# Patient Record
Sex: FEMALE | URBAN - METROPOLITAN AREA
[De-identification: names, ages, dates, MRNs, and addresses within clinical notes are randomized per-mention and may not be internally consistent; named-entity substitution may affect disease eponyms.]

---

## 2021-12-13 ENCOUNTER — APPOINTMENT (RX ONLY)
Dept: URBAN - METROPOLITAN AREA CLINIC 44 | Facility: CLINIC | Age: 81
Setting detail: DERMATOLOGY
End: 2021-12-13

## 2021-12-13 DIAGNOSIS — L24 IRRITANT CONTACT DERMATITIS: ICD-10-CM | Status: INADEQUATELY CONTROLLED

## 2021-12-13 PROBLEM — L24.9 IRRITANT CONTACT DERMATITIS, UNSPECIFIED CAUSE: Status: ACTIVE | Noted: 2021-12-13

## 2021-12-13 PROCEDURE — ? FULL BODY SKIN EXAM - DECLINED

## 2021-12-13 PROCEDURE — ? ADDITIONAL NOTES

## 2021-12-13 PROCEDURE — ? COUNSELING

## 2021-12-13 PROCEDURE — ? PRESCRIPTION

## 2021-12-13 PROCEDURE — ? MEDICATION COUNSELING

## 2021-12-13 PROCEDURE — 99203 OFFICE O/P NEW LOW 30 MIN: CPT

## 2021-12-13 PROCEDURE — ? PRESCRIPTION MEDICATION MANAGEMENT

## 2021-12-13 RX ORDER — TRIAMCINOLONE ACETONIDE 0.25 MG/G
OINTMENT TOPICAL
Qty: 80 | Refills: 0 | Status: ERX | COMMUNITY
Start: 2021-12-13

## 2021-12-13 RX ORDER — PREDNISONE 10 MG/1
TABLET ORAL
Qty: 40 | Refills: 0 | Status: ERX | COMMUNITY
Start: 2021-12-13

## 2021-12-13 RX ADMIN — PREDNISONE: 10 TABLET ORAL at 00:00

## 2021-12-13 RX ADMIN — TRIAMCINOLONE ACETONIDE: 0.25 OINTMENT TOPICAL at 00:00

## 2021-12-13 ASSESSMENT — LOCATION DETAILED DESCRIPTION DERM
LOCATION DETAILED: LEFT LOWER CUTANEOUS LIP
LOCATION DETAILED: LEFT UPPER CUTANEOUS LIP
LOCATION DETAILED: RIGHT UPPER CUTANEOUS LIP

## 2021-12-13 ASSESSMENT — LOCATION SIMPLE DESCRIPTION DERM
LOCATION SIMPLE: RIGHT LIP
LOCATION SIMPLE: LEFT LIP

## 2021-12-13 ASSESSMENT — LOCATION ZONE DERM: LOCATION ZONE: LIP

## 2021-12-13 NOTE — HPI: RASH
What Type Of Note Output Would You Prefer (Optional)?: Bullet Format
Is The Patient Presenting As Previously Scheduled?: Yes
How Severe Is Your Rash?: mild
Is This A New Presentation, Or A Follow-Up?: Rash
Additional History: Went to urgent care and was given several medications but nothing has worked

## 2021-12-13 NOTE — PROCEDURE: ADDITIONAL NOTES
Additional Notes: Patient consent was obtained to proceed with the visit and recommended plan of care after discussion of all risks and benefits, including the risks of COVID-19 exposure.
Detail Level: Simple
Render Risk Assessment In Note?: yes
Additional Notes: Discontinue fragranced soaps/lotions\\nUse gentle cleanser/lotion
Detail Level: Zone
Render Risk Assessment In Note?: no

## 2021-12-13 NOTE — PROCEDURE: PRESCRIPTION MEDICATION MANAGEMENT
Initiate Treatment: triamcinolone acetonide 0.025 % topical ointment \\nQuantity: 80.0 g  Days Supply: 30\\nSig: Apply to affected area of face, twice daily for 2 weeks as needed\\n\\nprednisone 10 mg tablet \\nQuantity: 40.0 Tablet\\nSig: Take 4 tablets po qd x 4 days, then 3 tablets po qd x 4 days, then 2 tablets po qd x 4 days, then 1 tablet po qd x 4 days then stop
Render In Strict Bullet Format?: No
Detail Level: Zone
Samples Given: Cetaphil/Cerave moisturizer and cleanser

## 2021-12-13 NOTE — PROCEDURE: MEDICATION COUNSELING
VIEW ALL Finasteride Pregnancy And Lactation Text: This medication is absolutely contraindicated during pregnancy. It is unknown if it is excreted in breast milk.

## 2022-01-03 ENCOUNTER — APPOINTMENT (RX ONLY)
Dept: URBAN - METROPOLITAN AREA CLINIC 44 | Facility: CLINIC | Age: 82
Setting detail: DERMATOLOGY
End: 2022-01-03

## 2022-01-03 VITALS — WEIGHT: 152 LBS

## 2022-01-03 DIAGNOSIS — L259 CONTACT DERMATITIS AND OTHER ECZEMA, UNSPECIFIED CAUSE: ICD-10-CM | Status: INADEQUATELY CONTROLLED

## 2022-01-03 PROBLEM — L30.9 DERMATITIS, UNSPECIFIED: Status: ACTIVE | Noted: 2022-01-03

## 2022-01-03 PROCEDURE — 11102 TANGNTL BX SKIN SINGLE LES: CPT

## 2022-01-03 PROCEDURE — ? PRESCRIPTION MEDICATION MANAGEMENT

## 2022-01-03 PROCEDURE — ? ADDITIONAL NOTES

## 2022-01-03 PROCEDURE — ? FULL BODY SKIN EXAM - DECLINED

## 2022-01-03 PROCEDURE — ? BIOPSY BY SHAVE METHOD

## 2022-01-03 ASSESSMENT — LOCATION SIMPLE DESCRIPTION DERM: LOCATION SIMPLE: RIGHT LIP

## 2022-01-03 ASSESSMENT — LOCATION DETAILED DESCRIPTION DERM: LOCATION DETAILED: RIGHT LOWER CUTANEOUS LIP

## 2022-01-03 ASSESSMENT — LOCATION ZONE DERM: LOCATION ZONE: LIP

## 2022-01-03 NOTE — PROCEDURE: ADDITIONAL NOTES
Additional Notes: Patient consent was obtained to proceed with the visit and recommended plan of care after discussion of all risks and benefits, including the risks of COVID-19 exposure.
Detail Level: Simple
Render Risk Assessment In Note?: yes
Additional Notes: Recommended skin diet avoid any products on face besides Vanicream \\n\\nDiscontinue fragranced soaps/lotions\\nUse gentle cleanser/lotion\\nDiscuss possible need for patch testing
Detail Level: Zone
Render Risk Assessment In Note?: no

## 2022-01-05 ENCOUNTER — RX ONLY (OUTPATIENT)
Age: 82
Setting detail: RX ONLY
End: 2022-01-05

## 2022-01-05 RX ORDER — PREDNISONE 20 MG/1
TABLET ORAL
Qty: 63 | Refills: 0 | Status: ERX | COMMUNITY
Start: 2022-01-05

## 2022-01-31 ENCOUNTER — APPOINTMENT (RX ONLY)
Dept: URBAN - METROPOLITAN AREA CLINIC 44 | Facility: CLINIC | Age: 82
Setting detail: DERMATOLOGY
End: 2022-01-31

## 2022-01-31 DIAGNOSIS — L20.89 OTHER ATOPIC DERMATITIS: ICD-10-CM | Status: INADEQUATELY CONTROLLED

## 2022-01-31 PROCEDURE — ? FULL BODY SKIN EXAM - DECLINED

## 2022-01-31 PROCEDURE — 99214 OFFICE O/P EST MOD 30 MIN: CPT

## 2022-01-31 PROCEDURE — ? PRESCRIPTION

## 2022-01-31 PROCEDURE — ? ADDITIONAL NOTES

## 2022-01-31 PROCEDURE — ? MEDICATION COUNSELING

## 2022-01-31 PROCEDURE — ? PRESCRIPTION MEDICATION MANAGEMENT

## 2022-01-31 PROCEDURE — ? COUNSELING

## 2022-01-31 RX ORDER — RUXOLITINIB 15 MG/G
CREAM TOPICAL
Qty: 60 | Refills: 1 | Status: ACTIVE

## 2022-01-31 ASSESSMENT — LOCATION ZONE DERM
LOCATION ZONE: LIP
LOCATION ZONE: FACE

## 2022-01-31 ASSESSMENT — LOCATION SIMPLE DESCRIPTION DERM
LOCATION SIMPLE: RIGHT CHEEK
LOCATION SIMPLE: RIGHT LIP
LOCATION SIMPLE: UPPER LIP
LOCATION SIMPLE: LEFT CHEEK

## 2022-01-31 ASSESSMENT — LOCATION DETAILED DESCRIPTION DERM
LOCATION DETAILED: PHILTRUM
LOCATION DETAILED: LEFT MEDIAL BUCCAL CHEEK
LOCATION DETAILED: RIGHT CENTRAL BUCCAL CHEEK
LOCATION DETAILED: RIGHT LOWER CUTANEOUS LIP

## 2022-01-31 NOTE — PROCEDURE: MEDICATION COUNSELING
DVT ppx- As per orthopedics. Topical Sulfur Applications Pregnancy And Lactation Text: This medication is Pregnancy Category C and has an unknown safety profile during pregnancy. It is unknown if this topical medication is excreted in breast milk.

## 2022-01-31 NOTE — PROCEDURE: PRESCRIPTION MEDICATION MANAGEMENT
Initiate Treatment: Opzelura 1.5 % topical cream \\nQuantity: 60.0 g  Days Supply: 30\\nSig: Apply to aa of the mouth bid x up to one week then as needed for flaring.
Detail Level: Zone
Render In Strict Bullet Format?: No
Plan: Continue skin diet. Briefly discussed MTX of not improved

## 2022-01-31 NOTE — PROCEDURE: ADDITIONAL NOTES
Detail Level: Zone
Render Risk Assessment In Note?: no
Additional Notes: Pt will be scheduled in to do PATCH TESTING next week.
Render Risk Assessment In Note?: yes
Detail Level: Simple
Additional Notes: Patient consent was obtained to proceed with the visit and recommended plan of care after discussion of all risks and benefits, including the risks of COVID-19 exposure.

## 2023-08-18 ENCOUNTER — OFFICE VISIT (OUTPATIENT)
Dept: URBAN - METROPOLITAN AREA CLINIC 54 | Facility: CLINIC | Age: 83
End: 2023-08-18
Payer: MEDICARE

## 2023-08-18 ENCOUNTER — WEB ENCOUNTER (OUTPATIENT)
Dept: URBAN - METROPOLITAN AREA CLINIC 54 | Facility: CLINIC | Age: 83
End: 2023-08-18

## 2023-08-18 VITALS
BODY MASS INDEX: 23.63 KG/M2 | SYSTOLIC BLOOD PRESSURE: 163 MMHG | HEART RATE: 58 BPM | HEIGHT: 65 IN | TEMPERATURE: 97.6 F | DIASTOLIC BLOOD PRESSURE: 80 MMHG | WEIGHT: 141.8 LBS

## 2023-08-18 DIAGNOSIS — R10.12 LUQ ABDOMINAL PAIN: ICD-10-CM

## 2023-08-18 DIAGNOSIS — R10.13 EPIGASTRIC PAIN: ICD-10-CM

## 2023-08-18 PROBLEM — 79922009: Status: ACTIVE | Noted: 2023-08-18

## 2023-08-18 PROBLEM — 301715003: Status: ACTIVE | Noted: 2023-08-18

## 2023-08-18 PROCEDURE — 99204 OFFICE O/P NEW MOD 45 MIN: CPT | Performed by: PHYSICIAN ASSISTANT

## 2023-08-18 RX ORDER — SUCRALFATE 1 G/1
1 TABLET ON AN EMPTY STOMACH TABLET ORAL TWICE A DAY
Qty: 28 TABLET | OUTPATIENT
Start: 2023-08-18 | End: 2023-09-01

## 2023-08-18 RX ORDER — OMEPRAZOLE 20 MG/1
1 CAPSULE 30 MINUTES BEFORE MORNING MEAL CAPSULE, DELAYED RELEASE ORAL ONCE A DAY
Qty: 30 | Refills: 1 | OUTPATIENT
Start: 2023-08-18

## 2023-08-18 RX ORDER — LEVOTHYROXINE SODIUM 25 UG/1
1 TABLET IN THE MORNING ON AN EMPTY STOMACH TABLET ORAL ONCE A DAY
Status: ACTIVE | COMMUNITY

## 2023-08-18 RX ORDER — NAPROXEN 375 MG/1
1 TABLET WITH FOOD OR MILK AS NEEDED TABLET ORAL
Status: ACTIVE | COMMUNITY

## 2023-08-18 RX ORDER — POLYETHYLENE GLYCOL 3350 17 G/17G
1 SCOOP MIXED WITH 8 OUNCES OF FLUID POWDER, FOR SOLUTION ORAL ONCE A DAY
Status: ACTIVE | COMMUNITY

## 2023-08-18 NOTE — HPI-TODAY'S VISIT:
Meri Daley is an 82-year-old female pt with PMH of hypothyroidism here for LUQ pain. Onset was March 2023. Patient dx with shingles in 3/2023 to left mid back and along LUQ.  After the rash healed, she continued to have LUQ pain. CT A/P performed in March that was WNL. RUQ US showed a gallstone but no signs of cholecystitis. Lab work including lipase WNL. Patient states pain is different from shingles but constant. She describes it as squeezing/pulsing. Pain is worse with eating. Denies reflux symptoms. NSAID use x 1 month. No previous abdominal surgeries. No hx of DM. Last EGD and colonoscopy was 20 years ago in [place]. Denies known cardiac history.
Unknown if ever smoked

## 2023-08-18 NOTE — PHYSICAL EXAM SKIN:
no rashes , no suspicious lesions , no areas of discoloration , no jaundice present , good turgor , no masses , no tenderness on palpation. no signs of shingles. No tenderness to light touch of the skin.

## 2023-08-30 ENCOUNTER — OFFICE VISIT (OUTPATIENT)
Dept: URBAN - METROPOLITAN AREA SURGERY CENTER 14 | Facility: SURGERY CENTER | Age: 83
End: 2023-08-30
Payer: MEDICARE

## 2023-08-30 ENCOUNTER — CLAIMS CREATED FROM THE CLAIM WINDOW (OUTPATIENT)
Dept: URBAN - METROPOLITAN AREA CLINIC 4 | Facility: CLINIC | Age: 83
End: 2023-08-30
Payer: MEDICARE

## 2023-08-30 DIAGNOSIS — K21.9 GASTRO-ESOPHAGEAL REFLUX DISEASE WITHOUT ESOPHAGITIS: ICD-10-CM

## 2023-08-30 DIAGNOSIS — B96.81 HELICOBACTER PYLORI [H. PYLORI] AS THE CAUSE OF DISEASES CLASSIFIED ELSEWHERE: ICD-10-CM

## 2023-08-30 DIAGNOSIS — K29.60 OTHER GASTRITIS WITHOUT BLEEDING: ICD-10-CM

## 2023-08-30 DIAGNOSIS — B96.81 BACTERIAL INFECTION DUE TO H. PYLORI: ICD-10-CM

## 2023-08-30 DIAGNOSIS — K21.9 GASTROESOPHAGEAL REFLUX DISEASE: ICD-10-CM

## 2023-08-30 DIAGNOSIS — R10.13 EPIGASTRIC PAIN: ICD-10-CM

## 2023-08-30 DIAGNOSIS — R10.12 LUQ ABDOMINAL PAIN: ICD-10-CM

## 2023-08-30 PROCEDURE — 88305 TISSUE EXAM BY PATHOLOGIST: CPT | Performed by: PATHOLOGY

## 2023-08-30 PROCEDURE — 88312 SPECIAL STAINS GROUP 1: CPT | Performed by: PATHOLOGY

## 2023-08-30 PROCEDURE — 43239 EGD BIOPSY SINGLE/MULTIPLE: CPT | Performed by: STUDENT IN AN ORGANIZED HEALTH CARE EDUCATION/TRAINING PROGRAM

## 2023-08-30 PROCEDURE — 88313 SPECIAL STAINS GROUP 2: CPT | Performed by: PATHOLOGY

## 2023-08-30 PROCEDURE — 88342 IMHCHEM/IMCYTCHM 1ST ANTB: CPT | Performed by: PATHOLOGY

## 2023-08-30 PROCEDURE — G8907 PT DOC NO EVENTS ON DISCHARG: HCPCS | Performed by: STUDENT IN AN ORGANIZED HEALTH CARE EDUCATION/TRAINING PROGRAM

## 2023-08-30 RX ORDER — NAPROXEN 375 MG/1
1 TABLET WITH FOOD OR MILK AS NEEDED TABLET ORAL
Status: ACTIVE | COMMUNITY

## 2023-08-30 RX ORDER — LEVOTHYROXINE SODIUM 25 UG/1
1 TABLET IN THE MORNING ON AN EMPTY STOMACH TABLET ORAL ONCE A DAY
Status: ACTIVE | COMMUNITY

## 2023-08-30 RX ORDER — POLYETHYLENE GLYCOL 3350 17 G/17G
1 SCOOP MIXED WITH 8 OUNCES OF FLUID POWDER, FOR SOLUTION ORAL ONCE A DAY
Status: ACTIVE | COMMUNITY

## 2023-08-30 RX ORDER — SUCRALFATE 1 G/1
1 TABLET ON AN EMPTY STOMACH TABLET ORAL TWICE A DAY
Qty: 28 TABLET | Status: ACTIVE | COMMUNITY
Start: 2023-08-18 | End: 2023-09-01

## 2023-08-30 RX ORDER — OMEPRAZOLE 20 MG/1
1 CAPSULE 30 MINUTES BEFORE MORNING MEAL CAPSULE, DELAYED RELEASE ORAL ONCE A DAY
Qty: 30 | Refills: 1 | Status: ACTIVE | COMMUNITY
Start: 2023-08-18

## 2023-09-07 ENCOUNTER — TELEPHONE ENCOUNTER (OUTPATIENT)
Dept: URBAN - METROPOLITAN AREA CLINIC 60 | Facility: CLINIC | Age: 83
End: 2023-09-07

## 2023-09-13 ENCOUNTER — OFFICE VISIT (OUTPATIENT)
Dept: URBAN - METROPOLITAN AREA CLINIC 54 | Facility: CLINIC | Age: 83
End: 2023-09-13

## 2023-09-13 ENCOUNTER — TELEPHONE ENCOUNTER (OUTPATIENT)
Dept: URBAN - METROPOLITAN AREA CLINIC 54 | Facility: CLINIC | Age: 83
End: 2023-09-13

## 2023-09-13 RX ORDER — CLARITHROMYCIN 500 MG/1
1 TABLET TABLET, FILM COATED ORAL
Qty: 28 TABLET | Refills: 0 | OUTPATIENT
Start: 2023-09-13 | End: 2023-09-27

## 2023-09-13 RX ORDER — POLYETHYLENE GLYCOL 3350 17 G/17G
1 SCOOP MIXED WITH 8 OUNCES OF FLUID POWDER, FOR SOLUTION ORAL ONCE A DAY
Status: ACTIVE | COMMUNITY

## 2023-09-13 RX ORDER — NAPROXEN 375 MG/1
1 TABLET WITH FOOD OR MILK AS NEEDED TABLET ORAL
Status: ACTIVE | COMMUNITY

## 2023-09-13 RX ORDER — LEVOTHYROXINE SODIUM 25 UG/1
1 TABLET IN THE MORNING ON AN EMPTY STOMACH TABLET ORAL ONCE A DAY
Status: ACTIVE | COMMUNITY

## 2023-09-13 RX ORDER — AMOXICILLIN 500 MG/1
2 TABLET CAPSULE ORAL TWICE DAILY
Qty: 56 TABLET | Refills: 0 | OUTPATIENT
Start: 2023-09-13 | End: 2023-09-27

## 2023-09-13 RX ORDER — PANTOPRAZOLE SODIUM 40 MG/1
1 TABLET TABLET, DELAYED RELEASE ORAL TWICE A DAY
Qty: 28 TABLET | Refills: 0 | OUTPATIENT
Start: 2023-09-13

## 2023-09-13 RX ORDER — OMEPRAZOLE 20 MG/1
1 CAPSULE 30 MINUTES BEFORE MORNING MEAL CAPSULE, DELAYED RELEASE ORAL ONCE A DAY
Qty: 30 | Refills: 1 | Status: ACTIVE | COMMUNITY
Start: 2023-08-18

## 2023-09-15 ENCOUNTER — TELEPHONE ENCOUNTER (OUTPATIENT)
Dept: URBAN - METROPOLITAN AREA CLINIC 54 | Facility: CLINIC | Age: 83
End: 2023-09-15

## 2023-09-27 ENCOUNTER — CLAIMS CREATED FROM THE CLAIM WINDOW (OUTPATIENT)
Dept: URBAN - METROPOLITAN AREA CLINIC 54 | Facility: CLINIC | Age: 83
End: 2023-09-27
Payer: MEDICARE

## 2023-09-27 VITALS
HEIGHT: 65 IN | WEIGHT: 138 LBS | DIASTOLIC BLOOD PRESSURE: 77 MMHG | TEMPERATURE: 97.9 F | SYSTOLIC BLOOD PRESSURE: 148 MMHG | BODY MASS INDEX: 22.99 KG/M2 | HEART RATE: 73 BPM

## 2023-09-27 DIAGNOSIS — A04.8 BACTERIAL INFECTION DUE TO H. PYLORI: ICD-10-CM

## 2023-09-27 PROCEDURE — 99212 OFFICE O/P EST SF 10 MIN: CPT | Performed by: PHYSICIAN ASSISTANT

## 2023-09-27 RX ORDER — CLARITHROMYCIN 500 MG/1
1 TABLET TABLET, FILM COATED ORAL
Qty: 28 TABLET | Refills: 0 | Status: ACTIVE | COMMUNITY
Start: 2023-09-13 | End: 2023-09-27

## 2023-09-27 RX ORDER — AMOXICILLIN 500 MG/1
2 TABLET CAPSULE ORAL TWICE DAILY
Qty: 56 TABLET | Refills: 0 | Status: ACTIVE | COMMUNITY
Start: 2023-09-13 | End: 2023-09-27

## 2023-09-27 RX ORDER — NAPROXEN 375 MG/1
1 TABLET WITH FOOD OR MILK AS NEEDED TABLET ORAL
Status: ACTIVE | COMMUNITY

## 2023-09-27 RX ORDER — PANTOPRAZOLE SODIUM 40 MG/1
1 TABLET TABLET, DELAYED RELEASE ORAL TWICE A DAY
Qty: 28 TABLET | Refills: 0 | Status: ACTIVE | COMMUNITY
Start: 2023-09-13

## 2023-09-27 RX ORDER — LEVOTHYROXINE SODIUM 25 UG/1
1 TABLET IN THE MORNING ON AN EMPTY STOMACH TABLET ORAL ONCE A DAY
Status: ACTIVE | COMMUNITY

## 2023-09-27 RX ORDER — POLYETHYLENE GLYCOL 3350 17 G/17G
1 SCOOP MIXED WITH 8 OUNCES OF FLUID POWDER, FOR SOLUTION ORAL ONCE A DAY
Status: ACTIVE | COMMUNITY

## 2023-09-27 RX ORDER — OMEPRAZOLE 20 MG/1
1 CAPSULE 30 MINUTES BEFORE MORNING MEAL CAPSULE, DELAYED RELEASE ORAL ONCE A DAY
Qty: 30 | Refills: 1 | Status: ACTIVE | COMMUNITY
Start: 2023-08-18

## 2023-09-27 NOTE — HPI-TODAY'S VISIT:
Meri Daley is an 82-year-old female pt with PMH of hypothyroidism here for LUQ pain. Onset was March 2023. Patient dx with shingles in 3/2023 to left mid back and along LUQ.  After the rash healed, she continued to have LUQ pain. CT A/P performed in March that was WNL. RUQ US showed a gallstone but no signs of cholecystitis. Lab work including lipase WNL. Patient states pain is different from shingles but constant. She describes it as squeezing/pulsing. Pain is worse with eating. Denies reflux symptoms. NSAID use x 1 month. No previous abdominal surgeries. No hx of DM. Last EGD and colonoscopy was 20 years ago in [place]. Denies known cardiac history.  9/27/23: Patient here for endoscopy follow-up.  Patient had EGD with Dr. Kothari that showed concern for possible short segment Sims's however biopsies were negative.  Erythematous mucosa in the stomach with biopsies positive for H pylori. Triple therapy was sent in but not picked up from pharmacy. Improved abdominal pain but still present.

## 2023-10-25 ENCOUNTER — LAB OUTSIDE AN ENCOUNTER (OUTPATIENT)
Dept: URBAN - METROPOLITAN AREA CLINIC 54 | Facility: CLINIC | Age: 83
End: 2023-10-25

## 2023-10-30 ENCOUNTER — OFFICE VISIT (OUTPATIENT)
Dept: URBAN - METROPOLITAN AREA CLINIC 54 | Facility: CLINIC | Age: 83
End: 2023-10-30

## 2023-10-30 LAB — H PYLORI BREATH TEST: DETECTED

## 2023-11-07 ENCOUNTER — TELEPHONE ENCOUNTER (OUTPATIENT)
Dept: URBAN - METROPOLITAN AREA CLINIC 54 | Facility: CLINIC | Age: 83
End: 2023-11-07

## 2023-11-07 ENCOUNTER — OFFICE VISIT (OUTPATIENT)
Dept: URBAN - METROPOLITAN AREA CLINIC 54 | Facility: CLINIC | Age: 83
End: 2023-11-07
Payer: MEDICARE

## 2023-11-07 VITALS
DIASTOLIC BLOOD PRESSURE: 61 MMHG | TEMPERATURE: 97.3 F | BODY MASS INDEX: 22.96 KG/M2 | HEIGHT: 65 IN | SYSTOLIC BLOOD PRESSURE: 129 MMHG | HEART RATE: 62 BPM | WEIGHT: 137.8 LBS

## 2023-11-07 DIAGNOSIS — A04.8 BACTERIAL INFECTION DUE TO H. PYLORI: ICD-10-CM

## 2023-11-07 PROCEDURE — 99213 OFFICE O/P EST LOW 20 MIN: CPT | Performed by: PHYSICIAN ASSISTANT

## 2023-11-07 RX ORDER — LEVOTHYROXINE SODIUM 25 UG/1
1 TABLET IN THE MORNING ON AN EMPTY STOMACH TABLET ORAL ONCE A DAY
Status: ACTIVE | COMMUNITY

## 2023-11-07 RX ORDER — TETRACYCLINE HYDROCHLORIDE 500 MG/1
1 CAPSULE ON AN EMPTY STOMACH CAPSULE ORAL
Qty: 56 CAPSULE | Refills: 0 | OUTPATIENT

## 2023-11-07 RX ORDER — METRONIDAZOLE 250 MG/1
1 TABLET TABLET ORAL
Qty: 56 | Refills: 0 | OUTPATIENT

## 2023-11-07 RX ORDER — PANTOPRAZOLE SODIUM 40 MG/1
1 TABLET TABLET, DELAYED RELEASE ORAL TWICE A DAY
Qty: 28 TABLET | Refills: 0 | OUTPATIENT

## 2023-11-07 NOTE — HPI-TODAY'S VISIT:
Meri Daley is an 82-year-old female pt with PMH of hypothyroidism here for LUQ pain. Onset was March 2023. Patient dx with shingles in 3/2023 to left mid back and along LUQ.  After the rash healed, she continued to have LUQ pain. CT A/P performed in March that was WNL. RUQ US showed a gallstone but no signs of cholecystitis. Lab work including lipase WNL. Patient states pain is different from shingles but constant. She describes it as squeezing/pulsing. Pain is worse with eating. Denies reflux symptoms. NSAID use x 1 month. No previous abdominal surgeries. No hx of DM. Last EGD and colonoscopy was 20 years ago in [place]. Denies known cardiac history.  9/27/23: Patient here for endoscopy follow-up.  Patient had EGD with Dr. Kothari that showed concern for possible short segment Sims's however biopsies were negative.  Erythematous mucosa in the stomach with biopsies positive for H pylori. Triple therapy was sent in but not picked up from pharmacy. Improved abdominal pain but still present.  11/7/23: Patient presents for routine follow-up.  She performed H. pylori breath test which resulted as still positive for H. pylori.  When patient was called by office staff to inform her of her results, she replied asking if she should continue the remaining antibiotics that she has.  Patient reports improved LUQ pain but is still present.

## 2023-11-09 ENCOUNTER — WEB ENCOUNTER (OUTPATIENT)
Dept: URBAN - METROPOLITAN AREA CLINIC 54 | Facility: CLINIC | Age: 83
End: 2023-11-09

## 2023-11-09 RX ORDER — BISMUTH SUBCITRATE POTASSIUM, METRONIDAZOLE, TETRACYCLINE HYDROCHLORIDE 140; 125; 125 MG/1; MG/1; MG/1
3 CAPSULES AFTER MEALS AND AT BEDTIME CAPSULE ORAL
Qty: 168 CAPSULE | Refills: 0 | OUTPATIENT
Start: 2023-11-10 | End: 2023-11-24

## 2023-12-28 LAB — H PYLORI BREATH TEST: DETECTED

## 2024-01-24 ENCOUNTER — OFFICE VISIT (OUTPATIENT)
Dept: URBAN - METROPOLITAN AREA CLINIC 54 | Facility: CLINIC | Age: 84
End: 2024-01-24
Payer: MEDICARE

## 2024-01-24 VITALS
TEMPERATURE: 97.2 F | SYSTOLIC BLOOD PRESSURE: 127 MMHG | BODY MASS INDEX: 22.99 KG/M2 | HEART RATE: 77 BPM | HEIGHT: 65 IN | WEIGHT: 138 LBS | DIASTOLIC BLOOD PRESSURE: 64 MMHG

## 2024-01-24 DIAGNOSIS — A04.8 H. PYLORI INFECTION: ICD-10-CM

## 2024-01-24 PROCEDURE — 99213 OFFICE O/P EST LOW 20 MIN: CPT | Performed by: PHYSICIAN ASSISTANT

## 2024-01-24 RX ORDER — LEVOFLOXACIN 500 MG/1
1 TABLET TABLET, FILM COATED ORAL ONCE A DAY
Qty: 14 TABLET | Refills: 0 | OUTPATIENT
Start: 2024-01-24 | End: 2024-02-07

## 2024-01-24 RX ORDER — AMOXICILLIN 250 MG/1
3 CAPSULES CAPSULE ORAL THREE TIMES A DAY
Qty: 126 CAPSULE | Refills: 0 | OUTPATIENT
Start: 2024-01-24 | End: 2024-02-07

## 2024-01-24 RX ORDER — PANTOPRAZOLE SODIUM 40 MG/1
1 TABLET TABLET, DELAYED RELEASE ORAL TWICE A DAY
Qty: 28 TABLET | Refills: 0 | Status: ACTIVE | COMMUNITY

## 2024-01-24 RX ORDER — LEVOTHYROXINE SODIUM 25 UG/1
1 TABLET IN THE MORNING ON AN EMPTY STOMACH TABLET ORAL ONCE A DAY
Status: ACTIVE | COMMUNITY

## 2024-01-24 RX ORDER — PANTOPRAZOLE SODIUM 40 MG/1
1 TABLET TABLET, DELAYED RELEASE ORAL TWICE A DAY
Qty: 28 TABLET | Refills: 0 | OUTPATIENT

## 2024-01-24 NOTE — HPI-TODAY'S VISIT:
Meri Daley is an 82-year-old female pt with PMH of hypothyroidism here for LUQ pain. Onset was March 2023. Patient dx with shingles in 3/2023 to left mid back and along LUQ.  After the rash healed, she continued to have LUQ pain. CT A/P performed in March that was WNL. RUQ US showed a gallstone but no signs of cholecystitis. Lab work including lipase WNL. Patient states pain is different from shingles but constant. She describes it as squeezing/pulsing. Pain is worse with eating. Denies reflux symptoms. NSAID use x 1 month. No previous abdominal surgeries. No hx of DM. Last EGD and colonoscopy was 20 years ago in [place]. Denies known cardiac history.  9/27/23: Patient here for endoscopy follow-up.  Patient had EGD with Dr. Kothari that showed concern for possible short segment Sims's however biopsies were negative.  Erythematous mucosa in the stomach with biopsies positive for H pylori. Triple therapy was sent in but not picked up from pharmacy. Improved abdominal pain but still present.  11/7/23: Patient presents for routine follow-up.  She performed H. pylori breath test which resulted as still positive for H. pylori.  When patient was called by office staff to inform her of her results, she replied asking if she should continue the remaining antibiotics that she has.  Patient reports improved LUQ pain but is still present.  1/24/24: Patient presents for routine follow-up.  Her H. pylori breath test returned positive after quadruple therapy.  Per patient today, she was able to take all 4 medicines including the tetracycline which was difficult for her to find an affordable price.  She still complains of LUQ pain but states this has significantly improved.  She is wondering if this is still H. pylori causing her abdominal pain or something else.  She is currently off PPI and only taking Synthroid.

## 2024-02-28 ENCOUNTER — LAB (OUTPATIENT)
Dept: URBAN - METROPOLITAN AREA CLINIC 54 | Facility: CLINIC | Age: 84
End: 2024-02-28

## 2024-03-27 ENCOUNTER — OV EP (OUTPATIENT)
Dept: URBAN - METROPOLITAN AREA CLINIC 54 | Facility: CLINIC | Age: 84
End: 2024-03-27
Payer: MEDICARE

## 2024-03-27 VITALS
BODY MASS INDEX: 22.56 KG/M2 | DIASTOLIC BLOOD PRESSURE: 60 MMHG | HEIGHT: 65 IN | SYSTOLIC BLOOD PRESSURE: 129 MMHG | TEMPERATURE: 97.2 F | WEIGHT: 135.4 LBS | HEART RATE: 75 BPM

## 2024-03-27 DIAGNOSIS — A04.8 H. PYLORI INFECTION: ICD-10-CM

## 2024-03-27 PROCEDURE — 99212 OFFICE O/P EST SF 10 MIN: CPT | Performed by: PHYSICIAN ASSISTANT

## 2024-03-27 RX ORDER — LEVOTHYROXINE SODIUM 25 UG/1
1 TABLET IN THE MORNING ON AN EMPTY STOMACH TABLET ORAL ONCE A DAY
Status: ACTIVE | COMMUNITY

## 2024-03-27 RX ORDER — PANTOPRAZOLE SODIUM 40 MG/1
1 TABLET TABLET, DELAYED RELEASE ORAL TWICE A DAY
Qty: 28 TABLET | Refills: 0 | Status: ON HOLD | COMMUNITY

## 2024-03-27 NOTE — HPI-TODAY'S VISIT:
Meri Daley is an 82-year-old female pt with PMH of hypothyroidism here for LUQ pain. Onset was March 2023. Patient dx with shingles in 3/2023 to left mid back and along LUQ.  After the rash healed, she continued to have LUQ pain. CT A/P performed in March that was WNL. RUQ US showed a gallstone but no signs of cholecystitis. Lab work including lipase WNL. Patient states pain is different from shingles but constant. She describes it as squeezing/pulsing. Pain is worse with eating. Denies reflux symptoms. NSAID use x 1 month. No previous abdominal surgeries. No hx of DM. Last EGD and colonoscopy was 20 years ago in [place]. Denies known cardiac history.  9/27/23: Patient here for endoscopy follow-up.  Patient had EGD with Dr. Kothari that showed concern for possible short segment Sims's however biopsies were negative.  Erythematous mucosa in the stomach with biopsies positive for H pylori. Triple therapy was sent in but not picked up from pharmacy. Improved abdominal pain but still present.  11/7/23: Patient presents for routine follow-up.  She performed H. pylori breath test which resulted as still positive for H. pylori.  When patient was called by office staff to inform her of her results, she replied asking if she should continue the remaining antibiotics that she has.  Patient reports improved LUQ pain but is still present.  1/24/24: Patient presents for routine follow-up.  Her H. pylori breath test returned positive after quadruple therapy.  Per patient today, she was able to take all 4 medicines including the tetracycline which was difficult for her to find an affordable price.  She still complains of LUQ pain but states this has significantly improved.  She is wondering if this is still H. pylori causing her abdominal pain or something else.  She is currently off PPI and only taking Synthroid.  3/27/24: Patient presents for routine follow up. Her H pylori breath test remains positive despite salvage therapy. She has no current abdominal pain. Off PPI.